# Patient Record
Sex: MALE | Race: WHITE
[De-identification: names, ages, dates, MRNs, and addresses within clinical notes are randomized per-mention and may not be internally consistent; named-entity substitution may affect disease eponyms.]

---

## 2017-01-05 ENCOUNTER — HOSPITAL ENCOUNTER (OUTPATIENT)
Dept: HOSPITAL 80 - BMCIMAGING | Age: 77
End: 2017-01-05
Attending: PHYSICIAN ASSISTANT
Payer: COMMERCIAL
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
## 2017-01-05 DIAGNOSIS — S62.115A: Primary | ICD-10-CM

## 2017-01-05 NOTE — DX
Left Wrist, Four Views, 11:11 a.m.

 

Clinical History: 76-year-old male who presents for follow up of a dorsal triquetral fracture sustain
ed after an injury on December 8, 2016.

 

ICD-10 Diagnostic Code: S62.1158.

 

Comparison Study: Left wrist, dated December 9, 2016.

 

Findings: In the interim, there has been development of a transversely-oriented healing sclerosis at 
the site of a distal radial metaphyseal nondisplaced fracture. There is also a punctate radial styloi
d avulsion fracture. There is an old healed ulnar distal metadiaphyseal junction fracture deformity. 
The previous study demonstrated a dorsal triquetral avulsion fracture fragment which is not appreciat
ed currently and could be superimposed or there could have been osseous incorporation since the previ
ous study. There is some degenerative osteoarthrosis with subchondral sclerosis of the distal navicul
ar-greater multangular articulation, and also some degenerative osteoarthrosis of the thumb carpometa
carpal joint. There is a subchondral geode (or marginal erosion) along the distal radial aspect of th
e navicular. The bones are demineralized.

 

Impression:

 

1. Healing sclerosis at the site of a nondisplaced distal radial metaphyseal fracture, with an associ
ated punctate radial styloid avulsion fracture.

2. Old healed fracture deformity of the distal ulnar metadiaphyseal junction.

3. The prior dorsal triquetral avulsion fracture is not appreciated today, which could be projectiona
lly superimposed or be related to some osseous incorporation.

4. Degenerative changes involving the distal radial aspect of the carpal bones and the thumb carpomet
acarpal joint.

## 2017-04-19 ENCOUNTER — HOSPITAL ENCOUNTER (OUTPATIENT)
Dept: HOSPITAL 80 - BMCIMAGING | Age: 77
End: 2017-04-19
Attending: PODIATRIST
Payer: COMMERCIAL

## 2017-04-19 DIAGNOSIS — M20.11: Primary | ICD-10-CM

## 2017-04-19 DIAGNOSIS — M19.071: ICD-10-CM

## 2017-04-19 DIAGNOSIS — M89.8X7: ICD-10-CM

## 2017-05-06 ENCOUNTER — HOSPITAL ENCOUNTER (INPATIENT)
Dept: HOSPITAL 80 - FED | Age: 77
LOS: 2 days | Discharge: HOME HEALTH | DRG: 91 | End: 2017-05-08
Attending: INTERNAL MEDICINE | Admitting: INTERNAL MEDICINE
Payer: COMMERCIAL

## 2017-05-06 DIAGNOSIS — R35.0: ICD-10-CM

## 2017-05-06 DIAGNOSIS — F03.90: ICD-10-CM

## 2017-05-06 DIAGNOSIS — J18.0: ICD-10-CM

## 2017-05-06 DIAGNOSIS — R39.15: ICD-10-CM

## 2017-05-06 DIAGNOSIS — N40.1: ICD-10-CM

## 2017-05-06 DIAGNOSIS — L30.9: ICD-10-CM

## 2017-05-06 DIAGNOSIS — G92: Primary | ICD-10-CM

## 2017-05-06 DIAGNOSIS — T45.0X5A: ICD-10-CM

## 2017-05-06 DIAGNOSIS — L40.9: ICD-10-CM

## 2017-05-06 LAB
% IMMATURE GRANULYOCYTES: 0.3 % (ref 0–1.1)
ABSOLUTE IMMATURE GRANULOCYTES: 0.02 10^3/UL (ref 0–0.1)
ABSOLUTE NRBC COUNT: 0 10^3/UL (ref 0–0.01)
ADD DIFF?: NO
ADD MORPH?: NO
ADD SCAN?: NO
ANION GAP SERPL CALC-SCNC: 9 MEQ/L (ref 8–16)
ATYPICAL LYMPHOCYTE FLAG: 0 (ref 0–99)
CALCIUM SERPL-MCNC: 10.1 MG/DL (ref 8.5–10.4)
CHLORIDE SERPL-SCNC: 103 MEQ/L (ref 97–110)
CO2 SERPL-SCNC: 25 MEQ/L (ref 22–31)
COLOR UR: YELLOW
CREAT SERPL-MCNC: 0.7 MG/DL (ref 0.7–1.3)
ERYTHROCYTE [DISTWIDTH] IN BLOOD BY AUTOMATED COUNT: 14.9 % (ref 11.5–15.2)
FRAGMENT RBC FLAG: 0 (ref 0–99)
GFR SERPL CREATININE-BSD FRML MDRD: > 60 ML/MIN/{1.73_M2}
GLUCOSE SERPL-MCNC: 149 MG/DL (ref 70–100)
HCT VFR BLD CALC: 41.7 % (ref 40–51)
HGB BLD-MCNC: 14.1 G/DL (ref 13.7–17.5)
LEFT SHIFT FLG: 0 (ref 0–99)
LIPEMIA HEMOLYSIS FLAG: 90 (ref 0–99)
MCH RBC BLDCO QN: 29.6 PG (ref 27.9–34.1)
MCHC RBC AUTO-ENTMCNC: 33.8 G/DL (ref 32.4–36.7)
MCV RBC AUTO: 87.6 FL (ref 81.5–99.8)
MUCOUS THREADS #/AREA URNS LPF: (no result) /LPF
NITRITE UR QL STRIP: NEGATIVE
NRBC-AUTO%: 0 % (ref 0–0.2)
PH UR STRIP: 6 [PH] (ref 5–7.5)
PLATELET # BLD: 252 10^3/UL (ref 150–400)
PLATELET CLUMPS FLAG: 0 (ref 0–99)
PMV BLD AUTO: 9.8 FL (ref 8.7–11.7)
POTASSIUM SERPL-SCNC: 4.5 MEQ/L (ref 3.5–5.2)
RBC # BLD AUTO: 4.76 10^6/UL (ref 4.4–6.38)
RBC #/AREA URNS HPF: (no result) /HPF (ref 0–3)
SODIUM SERPL-SCNC: 137 MEQ/L (ref 134–144)
SP GR UR STRIP: 1.02 (ref 1–1.03)
TROPONIN I SERPL-MCNC: < 0.012 NG/ML (ref 0–0.03)
WBC #/AREA URNS HPF: (no result) /HPF (ref 0–3)

## 2017-05-06 NOTE — GHP
[f rep st]



                                                            HISTORY AND PHYSICAL





DATE OF ADMISSION:  05/06/2017



CHIEF COMPLAINT:  Confusion.



HISTORY:  The patient is a 76-year-old male with dementia, who has been having increasing confusion 
for the last 2-3 days.  He is having a difficult time getting out of bed.  He and his wife were supp
osed to go to the opera tonight; however, he became acutely more incoherent, shaking, disoriented, a
nd she could barely get him in and out of the car to get to the hospital.  There has been no cough. 
 There is no obvious trouble swallowing.  He does, however, have poor p.o. intake.  There has been n
o recent weight loss.  He has chronic urinary frequency and urgency due to known underlying BPH.  Th
ere has been no dysuria.



PAST MEDICAL HISTORY:  

1.  Dementia.

2.  Psoriasis.

3.  Eczema.

4.  BPH.



PAST SURGICAL HISTORY:  Total knee arthroplasty.



MEDICATIONS:  Please see computer record for full detailed list.



ALLERGIES:  No known drug allergies.



SOCIAL HISTORY:  Distant smoking.  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  He is a ret
[employer] .  He lives with his wife of over 50 years.



REVIEW OF SYSTEMS:  Complete review of systems reviewed.  Review of systems negative regarding const
itutional, HEENT, GI, pulmonary, cardiovascular, , hematology, skin, musculoskeletal, endocrine, p
sych, except for positives as in HPI.



FAMILY HISTORY:  Reviewed, noncontributory to the presenting complaint.



PHYSICAL EXAMINATION:  GENERAL:  Well-developed, well-nourished male, in no acute distress.  VITAL S
IGNS:  Temperature is 37.2, pulse of 90, blood pressure 133/93, saturating 96% on 2 L.  EYES:  Paty
l conjunctivae.  Pupils equal and reactive to light.  ENT:  Normal ears and nose.  Hearing intact.  
Normal lips and teeth.  Oropharynx moist.  NECK:  Trachea midline.  No thyromegaly.  CHEST:  Normal 
respiratory effort.  LUNGS:  Clear to auscultation bilaterally.  CARDIOVASCULAR:  Regular rate and r
hythm.  No murmur.  No lower extremity edema.  ABDOMEN:  Soft.  Nontender.  No hepatosplenomegaly.  
SKIN:  Has an extensive rash, scaling plaques over his whole body which are chronic.  There is no ac
[place] erythema or warmth.  Wife is unclear which areas are eczema and which are psoriasis.  MUSCULOSKE
LETAL:  No cyanosis or clubbing.  Strength 5/5 upper and lower extremities.  NEUROLOGIC:  Cranial ne
rves intact.  Normal sensation to light touch.  PSYCH:  Awake and alert.  Very pleasant, cooperative
, interactive.  Does have memory lapses but is quite participatory and engaged in his care.



LABORATORY DATA:  White count 7.54, hematocrit 41.7, platelets 252.  Sodium 137, potassium 4.5, chlo
ride 103, bicarb 25, BUN 14, creatinine 0.7, glucose 149.  Troponin is negative.  Lactate 1.1.  Urin
alysis shows 5-10 white blood cells.  EKG reviewed by me, my personal interpretation is sinus tachyc
ardia, heart rate 117.  Chest x-ray shows right upper lobe pneumonia.  This case was discussed with 
Dr. Iman Cullen, emergency room physician.  He felt this was pneumonia and started on Levaquin.



ASSESSMENT AND PLAN:  

1.  Metabolic encephalopathy.  I suspect there is an underlying infection driving this, possibly pne
umonia versus urinary tract infection.  Will continue Levaquin.

2.  Probable right upper lobe pneumonia.  Given this right upper lobe location, we do need to consid
er aspiration, but he does not have any signs of it clinically.  Will consult Speech Therapy.  If un
derlying dysphagia is noted, would consider changing antibiotics to something with anaerobic coverag
e.  He does not have any identified TB risk factors.  

3.  Benign prostatic hypertrophy.  He has chronic symptoms of retention.  Not currently on any treat
ment.  Consider adding Flomax.  Will check bladder scans. 

4.  Dementia.  Continue Aricept and Namenda.

5.  Psoriasis and eczema.  Stable.



ADMISSION STATUS:  Will admit to inpatient as he is medically complex.  Anticipate greater than 2 mi
dnights.



CODE STATUS:  Full.



DVT PROPHYLAXIS:  He is high risk.  Will prescribe subcu Lovenox.





Job #:  [id number]

## 2017-05-06 NOTE — CPEKG
Heart Rate: 117

RR Interval: 513

P-R Interval: 184

QRSD Interval: 94

QT Interval: 320

QTC Interval: 447

P Axis: 38

QRS Axis: 24

T Wave Axis: -15

EKG Severity - BORDERLINE ECG -

EKG Impression: SINUS TACHYCARDIA

EKG Impression: PROBABLE LEFT ATRIAL ABNORMALITY

Electronically Signed By: Iman Cullen 06-May-2017 23:00:17

## 2017-05-06 NOTE — EDPHY
H & P


Time Seen by Provider: 05/06/17 17:20


HPI/ROS: 





CHIEF COMPLAINT:  Confusion





HISTORY OF PRESENT ILLNESS:  Patient is a 76-year-old male with a history of 

dementia.  Over the past few days he has had increasing confusion and altered 

mental status.  Patient's wife states this is not typical.  Wife states he had 

difficulty getting out of bed.  She was concerned that he was going to fall 

getting to the car.  This is not typical.  Patient has had an occasional cough.

  No fever or chills.  The patient has no specific complaints at this time.  He 

denies headache, neck pain, chest pain, shortness of breath, abdominal pain, 

nausea, vomiting or diarrhea.





REVIEW OF SYSTEMS:  


My complete review of systems is negative except as mentioned in the HPI.


Past Medical/Surgical History: 





Includes dementia, psoriasis, eczema, chronic foot pain





Past surgical history:  Negative





Social:  Patient lives at home with his wife.  He does not smoke.





Smoking Status: Former smoker


Physical Exam: 





Vitals noted. 37.3, 119/86, 129, 20, 93% on room air.  When I entered the room 

his heart rate was 100.


GENERAL:  Well-appearing, in no acute distress, alert.


Of note when I evaluated the patient and had him sit up he became dizzy.  Heart 

rate went from 100 to 110.


HEENT:  Eyes normal to inspection, normal pharynx, no signs of dehydration.


NECK:  No thyromegaly, no lymphadenopathy, supple.


RESPIRATORY:  Clear to auscultation bilaterally, no rales, rhonchi or wheezing.


CVS:  Regular rate and rhythm, no rubs, murmurs, or gallops.


ABDOMEN:  Soft, nontender, nondistended, no organomegaly.


BACK:  Normal to inspection, no CVA tenderness.


SKIN:  Normal color, diffuse psoriasis and eczema, warm, dry.  No pallor.


EXTREMITIES:  No pedal edema, no calf tenderness, no Homans sign or cords, no 

joint swelling.


NEURO/PSYCH:  Alert and oriented x3, normal mood and affect, normal motor 

sensory exam.  No obvious cranial nerve deficit.


Constitutional: 


 Initial Vital Signs











Temperature (C)  37.3 C   05/06/17 16:34


 


Heart Rate  129 H  05/06/17 16:34


 


Respiratory Rate  20   05/06/17 16:34


 


Blood Pressure  119/86 H  05/06/17 16:34


 


O2 Sat (%)  93   05/06/17 16:34








 











O2 Delivery Mode               Nasal Cannula


 


O2 (L/minute)                  2














Allergies/Adverse Reactions: 


 





No Known Allergies Allergy (Unverified 05/06/17 16:34)


 








Home Medications: 














 Medication  Instructions  Recorded


 


Donepezil HCl [Aricept] 5 mg PO 05/06/17


 


MIRTAZAPINE [Remeron 7.5 mg] 7.5 mg PO HS 05/06/17


 


Memantine HCl [Namenda 5 mg (*)] 5 mg PO BID 05/06/17














Medical Decision Making





- Diagnostics


EKG Interpretation: 





Sinus tachycardia 117. Normal axis.  Normal intervals.  Q-wave in II, III.  

Slight ST depression in V 4 and 5.


Imaging Results: 


 Imaging Impressions





Chest X-Ray  05/06/17 17:09


Impression: Suspect early right upper lobe pneumonia.











ED Course/Re-evaluation: 





In the emergency department I discussed possible etiologies with the patient.  

Laboratory studies, chest x-ray, and EKG ordered.  Cultures are pending.





The patient was given 1 L of normal saline on arrival.  On recheck he is still 

mildly tachycardic when sitting up.  He was given normal saline 500 mL IV.





Patient's laboratory studies were unremarkable. CBC and chemistry were normal. 

Lactic acid was normal.  UA is pending.





Chest x-ray:  Please refer the dictated report by the radiologist.  Early right 

upper lobe pneumonia.





The patient was given Levaquin 750 mg IV.  Cultures are pending.  I discussed 

the result with the patient and his wife.  I answered all her questions.





I discussed case with the hospitalist service.  Patient will be admitted for 

further care.


Differential Diagnosis: 





My differential includes but is not limited to bacteremia, sepsis, pneumonia, 

urinary tract infection, electrolyte abnormality, sugar abnormality, ACS, acute 

MI, dysrhythmia, dementia, CVA





- Data Points


Laboratory Results: 


 Laboratory Results





 05/06/17 16:43 





 05/06/17 16:43 





 











  05/06/17 05/06/17 05/06/17





  18:50 17:48 16:43


 


WBC      





    


 


RBC      





    


 


Hgb      





    


 


Hct      





    


 


MCV      





    


 


MCH      





    


 


MCHC      





    


 


RDW      





    


 


Plt Count      





    


 


MPV      





    


 


Neut % (Auto)      





    


 


Lymph % (Auto)      





    


 


Mono % (Auto)      





    


 


Eos % (Auto)      





    


 


Baso % (Auto)      





    


 


Nucleat RBC Rel Count      





    


 


Absolute Neuts (auto)      





    


 


Absolute Lymphs (auto)      





    


 


Absolute Monos (auto)      





    


 


Absolute Eos (auto)      





    


 


Absolute Basos (auto)      





    


 


Absolute Nucleated RBC      





    


 


Immature Gran %      





    


 


Immature Gran #      





    


 


VBG Lactic Acid    1.1 mmol/L mmol/L  





    (0.7-2.1)  


 


Sodium      137 mEq/L mEq/L





     (134-144) 


 


Potassium      4.5 mEq/L mEq/L





     (3.5-5.2) 


 


Chloride      103 mEq/L mEq/L





     () 


 


Carbon Dioxide      25 mEq/l mEq/l





     (22-31) 


 


Anion Gap      9 mEq/L mEq/L





     (8-16) 


 


BUN      14 mg/dL mg/dL





     (7-23) 


 


Creatinine      0.7 mg/dL mg/dL





     (0.7-1.3) 


 


Estimated GFR      > 60 





    


 


Glucose      149 mg/dL H mg/dL





     () 


 


Calcium      10.1 mg/dL mg/dL





     (8.5-10.4) 


 


Troponin I      < 0.012 ng/mL ng/mL





     (0-0.034) 


 


Urine Color  Pending     





    


 


Urine Appearance  Pending     





    


 


Urine pH  Pending     





    


 


Ur Specific Gravity  Pending     





    


 


Urine Protein  Pending     





    


 


Urine Ketones  Pending     





    


 


Urine Blood  Pending     





    


 


Urine Nitrate  Pending     





    


 


Urine Bilirubin  Pending     





    


 


Urine Urobilinogen  Pending     





    


 


Ur Leukocyte Esterase  Pending     





    


 


Urine Glucose  Pending     





    














  05/06/17





  16:43


 


WBC  7.54 10^3/uL 10^3/uL





   (3.80-9.50) 


 


RBC  4.76 10^6/uL 10^6/uL





   (4.40-6.38) 


 


Hgb  14.1 g/dL g/dL





   (13.7-17.5) 


 


Hct  41.7 % %





   (40.0-51.0) 


 


MCV  87.6 fL fL





   (81.5-99.8) 


 


MCH  29.6 pg pg





   (27.9-34.1) 


 


MCHC  33.8 g/dL g/dL





   (32.4-36.7) 


 


RDW  14.9 % %





   (11.5-15.2) 


 


Plt Count  252 10^3/uL 10^3/uL





   (150-400) 


 


MPV  9.8 fL fL





   (8.7-11.7) 


 


Neut % (Auto)  71.1 % %





   (39.3-74.2) 


 


Lymph % (Auto)  15.5 % %





   (15.0-45.0) 


 


Mono % (Auto)  11.9 % %





   (4.5-13.0) 


 


Eos % (Auto)  0.4 % L %





   (0.6-7.6) 


 


Baso % (Auto)  0.8 % %





   (0.3-1.7) 


 


Nucleat RBC Rel Count  0.0 % %





   (0.0-0.2) 


 


Absolute Neuts (auto)  5.36 10^3/uL 10^3/uL





   (1.70-6.50) 


 


Absolute Lymphs (auto)  1.17 10^3/uL 10^3/uL





   (1.00-3.00) 


 


Absolute Monos (auto)  0.90 10^3/uL H 10^3/uL





   (0.30-0.80) 


 


Absolute Eos (auto)  0.03 10^3/uL 10^3/uL





   (0.03-0.40) 


 


Absolute Basos (auto)  0.06 10^3/uL 10^3/uL





   (0.02-0.10) 


 


Absolute Nucleated RBC  0.00 10^3/uL 10^3/uL





   (0-0.01) 


 


Immature Gran %  0.3 % %





   (0.0-1.1) 


 


Immature Gran #  0.02 10^3/uL 10^3/uL





   (0.00-0.10) 


 


VBG Lactic Acid  





  


 


Sodium  





  


 


Potassium  





  


 


Chloride  





  


 


Carbon Dioxide  





  


 


Anion Gap  





  


 


BUN  





  


 


Creatinine  





  


 


Estimated GFR  





  


 


Glucose  





  


 


Calcium  





  


 


Troponin I  





  


 


Urine Color  





  


 


Urine Appearance  





  


 


Urine pH  





  


 


Ur Specific Gravity  





  


 


Urine Protein  





  


 


Urine Ketones  





  


 


Urine Blood  





  


 


Urine Nitrate  





  


 


Urine Bilirubin  





  


 


Urine Urobilinogen  





  


 


Ur Leukocyte Esterase  





  


 


Urine Glucose  





  











Medications Given: 


 








Discontinued Medications





Sodium Chloride (Ns)  1,000 mls @ 0 mls/hr IV ONCE ONE


   PRN Reason: Wide Open


   Stop: 05/06/17 17:10


   Last Admin: 05/06/17 17:11 Dose:  1,000 mls


Sodium Chloride (Ns)  500 mls @ 0 mls/hr IV ONCE ONE


   PRN Reason: Wide Open


   Stop: 05/06/17 17:59


   Last Admin: 05/06/17 18:15 Dose:  500 mls








Departure





- Departure


Disposition: [facility]


Clinical Impression: 


 Confusion





Right upper lobe pneumonia


Qualifiers:


 Pneumonia type: due to unspecified organism Qualified Code(s): J18.1 - Lobar 

pneumonia, unspecified organism





Condition: Good


Referrals: 


Flora Herrera MD [Primary Care Provider] - As per Instructions

## 2017-05-07 LAB
% IMMATURE GRANULYOCYTES: 0.2 % (ref 0–1.1)
ABSOLUTE IMMATURE GRANULOCYTES: 0.01 10^3/UL (ref 0–0.1)
ABSOLUTE NRBC COUNT: 0 10^3/UL (ref 0–0.01)
ADD DIFF?: NO
ADD MORPH?: NO
ADD SCAN?: NO
ALBUMIN SERPL-MCNC: 3.3 G/DL (ref 3.5–5)
ALP SERPL-CCNC: 62 IU/L (ref 38–126)
ALT SERPL-CCNC: 24 IU/L (ref 21–72)
ANION GAP SERPL CALC-SCNC: 7 MEQ/L (ref 8–16)
AST SERPL-CCNC: 15 IU/L (ref 17–59)
ATYPICAL LYMPHOCYTE FLAG: 30 (ref 0–99)
BILIRUB SERPL-MCNC: 0.7 MG/DL (ref 0.1–1.4)
BILIRUBIN-CONJUGATED: 0.3 MG/DL (ref 0–0.5)
BILIRUBIN-UNCONJUGATED: 0.4 MG/DL (ref 0–1.1)
CALCIUM SERPL-MCNC: 9.2 MG/DL (ref 8.5–10.4)
CHLORIDE SERPL-SCNC: 110 MEQ/L (ref 97–110)
CO2 SERPL-SCNC: 25 MEQ/L (ref 22–31)
CREAT SERPL-MCNC: 0.6 MG/DL (ref 0.7–1.3)
ERYTHROCYTE [DISTWIDTH] IN BLOOD BY AUTOMATED COUNT: 15 % (ref 11.5–15.2)
FRAGMENT RBC FLAG: 0 (ref 0–99)
GFR SERPL CREATININE-BSD FRML MDRD: > 60 ML/MIN/{1.73_M2}
GLUCOSE SERPL-MCNC: 91 MG/DL (ref 70–100)
HCT VFR BLD CALC: 36 % (ref 40–51)
HGB BLD-MCNC: 12.2 G/DL (ref 13.7–17.5)
INR PPP: 1.15 (ref 0.83–1.16)
LEFT SHIFT FLG: 0 (ref 0–99)
LIPEMIA HEMOLYSIS FLAG: 90 (ref 0–99)
MCH RBC BLDCO QN: 29.9 PG (ref 27.9–34.1)
MCHC RBC AUTO-ENTMCNC: 33.9 G/DL (ref 32.4–36.7)
MCV RBC AUTO: 88.2 FL (ref 81.5–99.8)
NRBC-AUTO%: 0 % (ref 0–0.2)
PLATELET # BLD: 196 10^3/UL (ref 150–400)
PLATELET CLUMPS FLAG: 10 (ref 0–99)
PMV BLD AUTO: 9.2 FL (ref 8.7–11.7)
POTASSIUM SERPL-SCNC: 4 MEQ/L (ref 3.5–5.2)
PROT SERPL-MCNC: 5.8 G/DL (ref 6.3–8.2)
PROTHROMBIN TIME: 14.6 SEC (ref 12–15)
RBC # BLD AUTO: 4.08 10^6/UL (ref 4.4–6.38)
SODIUM SERPL-SCNC: 142 MEQ/L (ref 134–144)

## 2017-05-07 RX ADMIN — MEMANTINE HYDROCHLORIDE SCH MG: 5 TABLET ORAL at 20:26

## 2017-05-07 RX ADMIN — MEMANTINE HYDROCHLORIDE SCH MG: 5 TABLET ORAL at 08:02

## 2017-05-07 RX ADMIN — LEVOFLOXACIN SCH MLS: 5 INJECTION, SOLUTION INTRAVENOUS at 08:01

## 2017-05-07 RX ADMIN — ENOXAPARIN SODIUM SCH MG: 100 INJECTION SUBCUTANEOUS at 08:01

## 2017-05-07 NOTE — HOSPPROG
Hospitalist Progress Note


Assessment/Plan: 





77 yo M w dementia here w confusion and possible RUL Pneumonia





pneumonia: probable


   continue levoflox


   cxr in AM





encephalopathy: superimposed on pre-existing dementia


   has recently taken tylenol pm (w benadryl)


   lytes ok


   no alcohol


   





proph: lmwh





endo: slightly low tsh- uncertain clinical significance


   check t3/t4





code: full 





dispo: inpt


Subjective: cxr w probable rul infiltrate (interp by me).  ekg non ischemic (

interp by me)


Objective: 


 Vital Signs











Temp Pulse Resp BP Pulse Ox


 


 37.0 C   92   16   107/68   92 


 


 05/07/17 11:28  05/07/17 11:28  05/07/17 11:28  05/07/17 11:28  05/07/17 11:28








 Laboratory Results





 05/07/17 04:20 





 05/07/17 04:20 





 











 05/06/17 05/07/17 05/08/17





 05:59 05:59 05:59


 


Intake Total  500 


 


Output Total  1150 300


 


Balance  -650 -300








 











PT  14.6 SEC (12.0-15.0)   05/07/17  04:20    


 


INR  1.15  (0.83-1.16)   05/07/17  04:20    














- Physical Exam


Constitutional: no apparent distress, appears nourished


Eyes: PERRL, anicteric sclera


Ears, Nose, Mouth, Throat: moist mucous membranes, hearing normal


Cardiovascular: regular rate and rhythym, no murmur, rub, or gallop


Respiratory: no respiratory distress, no rales or rhonchi, other (RUL CTA)


Gastrointestinal: normoactive bowel sounds, soft, non-tender abdomen


Genitourinary: no bladder fullness, No robles in urethra


Skin: warm, normal color


Neurologic: other (alert, conversant, confused, articulate)


Psychiatric: interacting appropriately





ICD10 Worksheet


Patient Problems: 


 Problems











Problem Status Onset


 


Confusion Acute  


 


Right upper lobe pneumonia Acute

## 2017-05-08 VITALS
TEMPERATURE: 98.4 F | DIASTOLIC BLOOD PRESSURE: 63 MMHG | SYSTOLIC BLOOD PRESSURE: 107 MMHG | HEART RATE: 94 BPM | OXYGEN SATURATION: 93 %

## 2017-05-08 VITALS — RESPIRATION RATE: 16 BRPM

## 2017-05-08 RX ADMIN — LEVOFLOXACIN SCH MLS: 5 INJECTION, SOLUTION INTRAVENOUS at 09:07

## 2017-05-08 RX ADMIN — ENOXAPARIN SODIUM SCH MG: 100 INJECTION SUBCUTANEOUS at 09:07

## 2017-05-08 RX ADMIN — MEMANTINE HYDROCHLORIDE SCH MG: 5 TABLET ORAL at 09:07

## 2017-05-08 NOTE — PDIAF
- Diagnosis


Diagnosis: metabolic encephalopathy


Code Status: Full Code





- Medication Management


Discharge Medications: 


 Medications to Continue on Transfer





Donepezil HCl 10 mg PO HS 05/06/17 [Last Taken 05/05/17]


Ergocalciferol [Vitamin D2 (*)] 50,000 unit PO ENRIQUE 05/06/17 [Last Taken 04/30/17]


MIRTAZAPINE [Remeron 7.5 mg] 7.5 mg PO HS 05/06/17 [Last Taken 05/05/17]


Memantine HCl [Namenda 10 mg] 10 mg PO BID 05/06/17 [Last Taken 05/06/17 08:00]


Methylphenidate HCl [Methylphenidate ER] 27 mg PO DAILY 05/06/17 [Last Taken 05/ 06/17]


Donepezil HCl [Aricept] 10 mg PO HS 05/07/17 [Last Taken 05/05/17]


Phospserin/Omega-3/Dha/Epa [Vayacog Capsules] 1 each PO BID 05/07/17 [Last 

Taken 05/06/17 08:00]


levOFLOXACIN [levAQUIN (*)] 750 mg PO DAILY #2 tab 05/08/17 [Last Taken Unknown]


traZODone [traZODONE 50MG (*)] 50 mg PO HS #30 tab 05/08/17 [Last Taken Unknown]








Discharge Medications: Refer to the Discharge Home Medication list for PRN 

reason.





- Orders


Services needed: Home Care, Physical Therapy, Occupational Therapy


Home Care Face to Face: I certify that this patient was under my care and that 

I had the required face-to-face encounter meeting the encounter requirements on 

the discharge day.  My findings support the fact that the patient is homebound 

as defined in CMS Chapter 7 Medicare Benefits Manual 30.1.1, The condition of 

the patient is such that there exists a normal inability to leave home and 

consequently, leaving home would require a considerable and taxing effort.


Diet Texture: Regular Texture Diet, Thin Liquids, Meds Whole w/Liquids, Meds 

Whole in Puree





- Follow Up Care


Current Providers and Referrals: 


Flora Herrera MD [Primary Care Provider] - As per Instructions

## 2017-05-08 NOTE — GDS
[f rep st]



                                                             DISCHARGE SUMMARY





DISCHARGE DIAGNOSES:  

1.  Metabolic encephalopathy.

2.  Possible pneumonia.

3.  BPH.

4.  Dementia.

5.  Psoriasis and eczema.



HISTORY:  The patient is a 76-year-old male with dementia who presented with increasing confusion an
d weakness.  Initial chest x-ray was felt to have possible pneumonia, and he was started on Levaquin
.  He improved quite rapidly, and is now back to baseline.  Followup chest x-ray shows minimal infil
trates, so it is not clear if there ever truly was a pneumonia.  I did discuss this with the patient
 and his wife, and they did desire to complete the course of antibiotics, even though understanding 
the diagnosis of pneumonia is uncertain.  He has 2 more days of Levaquin to complete a 5-day course.
  He has completely returned to baseline mental status, and his wife felt very comfortable taking hi
m home.  He does take intermittent Benadryl for sleep, which may have caused the encephalopathy.  He
 was recommended to avoid this medication.  They did request an alternative sleep aid, and he was pr
escribed trazodone.



DISCHARGE MEDICATIONS:  Please see computer record for full detailed list. 



New medications:  

1.  Levaquin 750 mg p.o. daily for 2 more days.

2.  Trazodone 50 mg p.o. at bedtime.



ADDITIONAL DISCHARGE INSTRUCTIONS:  Follow up with primary care, Dr. Flora Herrera. 



Greater 30 minutes' time was spent in arranging this discharge.  Patient seen and examined by me on 
day of discharge.





Job #:  [id number]

## 2018-06-12 ENCOUNTER — HOSPITAL ENCOUNTER (OUTPATIENT)
Dept: HOSPITAL 80 - FCPNEURO | Age: 78
End: 2018-06-12
Attending: PSYCHIATRY & NEUROLOGY
Payer: COMMERCIAL

## 2018-06-12 DIAGNOSIS — G47.33: Primary | ICD-10-CM
